# Patient Record
Sex: MALE | Race: WHITE
[De-identification: names, ages, dates, MRNs, and addresses within clinical notes are randomized per-mention and may not be internally consistent; named-entity substitution may affect disease eponyms.]

---

## 2017-03-25 ENCOUNTER — HOSPITAL ENCOUNTER (EMERGENCY)
Dept: HOSPITAL 14 - H.ER | Age: 22
Discharge: HOME | End: 2017-03-25
Payer: MEDICAID

## 2017-03-25 VITALS
HEART RATE: 65 BPM | RESPIRATION RATE: 16 BRPM | DIASTOLIC BLOOD PRESSURE: 82 MMHG | TEMPERATURE: 97.8 F | SYSTOLIC BLOOD PRESSURE: 150 MMHG | OXYGEN SATURATION: 99 %

## 2017-03-25 DIAGNOSIS — L50.9: Primary | ICD-10-CM

## 2017-03-25 NOTE — ED PDOC
HPI: Skin/Bite Injury


Time Seen by Provider: 03/25/17 02:29


Chief Complaint (Nursing): Abnormal Skin Integrity


Chief Complaint (Provider): Rash


History Per: Patient


History/Exam Limitations: no limitations


Onset/Duration Of Symptoms: Days (3 weeks )


Current Symptoms Are (Timing): Still Present


Quality Of Symptoms: Itching


Severity: Moderate


Pain Scale Rating Of: 5


Additional Complaint(s): 


20 yo male with no medical problems presents with an itchy rash on/off for 3 

weeks. Pt states that it comes and goes all over his body. No new foods/lotions

, etc. 





Past Medical History


Reviewed: Historical Data, Nursing Documentation, Vital Signs


Vital Signs: 


 Last Vital Signs











Temp  97.8 F   03/25/17 02:21


 


Pulse  65   03/25/17 02:21


 


Resp  16   03/25/17 02:21


 


BP  150/82   03/25/17 02:21


 


Pulse Ox  99   03/25/17 03:24














- Medical History


PMH: No Chronic Diseases





- Surgical History


Surgical History: No Surg Hx





- Family History


Family History: States: Unknown Family Hx





- Living Arrangements


Living Arrangements: With Family





- Social History


Current smoker - smoking cessation education provided: No


Alcohol: None


Drugs: Denies





- Immunization History


Hx Tetanus Toxoid Vaccination: No


Hx Influenza Vaccination: No


Hx Pneumococcal Vaccination: No





- Home Medications


Home Medications: 


 Ambulatory Orders











 Medication  Instructions  Recorded


 


Acetaminophen [Tylenol] 325 mg PO Q6 PRN #30 tab 02/29/16


 


predniSONE [predniSONE Tab] 20 mg PO DAILY #12 tab 03/25/17














- Allergies


Allergies/Adverse Reactions: 


 Allergies











Allergy/AdvReac Type Severity Reaction Status Date / Time


 


No Known Allergies Allergy   Verified 02/29/16 20:08














Review of Systems


ROS Statement: Except As Marked, All Systems Reviewed And Found Negative


Skin: Positive for: Rash





- ECG


O2 Sat by Pulse Oximetry: 99





Medical Decision Making


Medical Decision Making: 


Pt also reports his urine being more yellow than usual. Pt states that he is 

sexually active and does not always use protection. 





Disposition





- Clinical Impression


Clinical Impression: 


 Urticaria





- Patient ED Disposition


Is Patient to be Admitted: No


Counseled Patient/Family Regarding: Diagnosis, Need For Followup





- Disposition


Referrals: 


[facility] [place] [Outside]


Perlman,Donald B, MD [Staff Provider] - 


Disposition: Routine/Home


Disposition Time: 03:23


Condition: STABLE


Prescriptions: 


predniSONE [predniSONE Tab] 20 mg PO DAILY #12 tab


Instructions:  Urticaria (ED)

## 2018-06-21 ENCOUNTER — HOSPITAL ENCOUNTER (EMERGENCY)
Dept: HOSPITAL 31 - C.ER | Age: 23
Discharge: HOME | End: 2018-06-21
Payer: MEDICAID

## 2018-06-21 VITALS
RESPIRATION RATE: 17 BRPM | TEMPERATURE: 97.9 F | DIASTOLIC BLOOD PRESSURE: 68 MMHG | SYSTOLIC BLOOD PRESSURE: 110 MMHG | HEART RATE: 62 BPM

## 2018-06-21 VITALS — BODY MASS INDEX: 23.6 KG/M2

## 2018-06-21 VITALS — OXYGEN SATURATION: 99 %

## 2018-06-21 DIAGNOSIS — J06.9: Primary | ICD-10-CM

## 2018-06-21 DIAGNOSIS — B34.9: ICD-10-CM

## 2018-06-21 NOTE — C.PDOC
History Of Present Illness


22-year-old male presents to the emergency department with complaints of cough, 

nasal congestion, associated with subjective fever for appox 2 weeks. Patient 

denies nausea/vomiting, recent travel, rashes, chest pain, SOB, or any other 

associated symptoms. No other complaints at this time.


Time Seen by Provider: 06/21/18 08:25


Chief Complaint (Nursing): Cough, Cold, Congestion


History Per: Patient


History/Exam Limitations: no limitations


Onset/Duration Of Symptoms: Days


Current Symptoms Are (Timing): Still Present


Severity: Mild





Past Medical History


Reviewed: Historical Data, Nursing Documentation, Vital Signs


Vital Signs: 


 Last Vital Signs











Temp  97.9 F   06/21/18 09:54


 


Pulse  62   06/21/18 09:54


 


Resp  17   06/21/18 09:54


 


BP  110/68   06/21/18 09:54


 


Pulse Ox  99   06/21/18 13:59














- Medical History


PMH: No Chronic Diseases


Family History: States: No Known Family Hx





- Social History


Hx Alcohol Use: No


Hx Substance Use: Yes





- Immunization History


Hx Tetanus Toxoid Vaccination: No


Hx Influenza Vaccination: Yes


Hx Pneumococcal Vaccination: No





Review Of Systems


Constitutional: Positive for: Fever


ENT: Positive for: Nose Congestion.  Negative for: Ear Pain


Cardiovascular: Negative for: Chest Pain, Palpitations


Respiratory: Positive for: Cough.  Negative for: Shortness of Breath


Gastrointestinal: Negative for: Nausea, Vomiting, Abdominal Pain


Musculoskeletal: Negative for: Neck Pain, Back Pain


Skin: Negative for: Rash





Physical Exam





- Physical Exam


Appears: Well, Non-toxic, No Acute Distress, Other (speaking in full sentences)


Skin: Normal Color, Warm, Dry, No Rash


Eye(s): bilateral: Normal Inspection


Ear(s): Bilateral: Normal


Nose: Normal


Oral Mucosa: Moist


Throat: Normal, No Erythema, No Exudate


Lymphatic: No Adenopathy


Cardiovascular: Rhythm Regular


Respiratory: Normal Breath Sounds, No Rales, No Rhonchi, No Wheezing


Gastrointestinal/Abdominal: Normal Exam, Bowel Sounds, Soft, No Tenderness


Extremity: No Deformity, No Swelling


Neurological/Psych: Oriented x3





ED Course And Treatment


O2 Sat by Pulse Oximetry: 99 (RA)


Pulse Ox Interpretation: Normal





- Radiology


CXR: Interpreted by Me, Viewed By Me


CXR Interpretation: Yes: No Acute Disease.  No: Infiltrates


Progress Note: CXR ordered and reviewed.  Patient given PO sudafed and tessalon 

in ED.


Reevaluation Time: 09:35


Reassessment Condition: Improved (On reassessment, patient is resting 

comfortably.  CXR negative for acute infiltrates.  Patient reassured symptoms 

are likely viral.  Rxs for Tessalon and Sudafed given.  Patient instructed to 

follow up with PMD/clinic in 1-2 days.  He understands he should return to ED 

if symptoms worsen.)





Disposition


Counseled Patient/Family Regarding: Studies Performed, Diagnosis, Need For 

Followup, Rx Given





- Disposition


Referrals: 


[facility] [Outside]


Disposition: HOME/ ROUTINE


Disposition Time: 09:35


Condition: STABLE


Additional Instructions: 


FOLLOW UP WITH YOUR DOCTOR/CLINIC IN 1-2 DAYS





USE MEDICATIONS AS NEEDED, TYLENOL OR IBUPROFEN IF NEEDED FOR PAIN





DRINK PLENTY OF FLUIDS





RETURN TO EMERGENCY ROOM IF SYMPTOMS WORSEN 








SEGUIMIENTO CON VARMA MDICO / CLNICA EN 1-2 CABRERA





USE MEDICAMENTOS SEGN SEA NECESARIO, TYLENOL O IBUPROFEN SI ES NECESARIO PARA 

DOLOR





BEBER MUCHO LQUIDO





REGRESE AL KALE DE EMERGENCIA SI LOS SNTOMAS EMPEORAN


Prescriptions: 


Benzonatate [Tessalon Perles] 100 mg PO BID PRN #15 sgl


 PRN Reason: Cough


Pseudoephedrine [Sudafed] 60 mg PO Q6 PRN #12 tab


 PRN Reason: Nasal Congestion


Instructions:  Upper Respiratory Infection (ED)


Forms:  [facility] (English), Work Excuse


Print Language: Maldivian





- Clinical Impression


Clinical Impression: 


 Viral disease, Upper respiratory infection








- Scribe Statement


The provider has reviewed the documentation as recorded by the Scribe (Carmeal Gil)








All medical record entries made by the Scribe were at my direction and 

personally dictated by me. I have reviewed the chart and agree that the record 

accurately reflects my personal performance of the history, physical exam, 

medical decision making, and the department course for this patient. I have 

also personally directed, reviewed, and agree with the discharge instructions 

and disposition.

## 2018-06-21 NOTE — RAD
HISTORY:

COUGH  



COMPARISON:

PrintNo prior.



TECHNIQUE:

Chest PA and lateral



FINDINGS:



LUNGS:

No active pulmonary disease.



PLEURA:

No significant pleural effusion identified. No pneumothorax apparent.



CARDIOVASCULAR:

Normal.



OSSEOUS STRUCTURES:

No significant abnormalities.



VISUALIZED UPPER ABDOMEN:

Normal.



OTHER FINDINGS:

None.



IMPRESSION:

No active disease.